# Patient Record
Sex: FEMALE | Race: WHITE
[De-identification: names, ages, dates, MRNs, and addresses within clinical notes are randomized per-mention and may not be internally consistent; named-entity substitution may affect disease eponyms.]

---

## 2018-07-07 ENCOUNTER — HOSPITAL ENCOUNTER (INPATIENT)
Dept: HOSPITAL 5 - TRG | Age: 43
LOS: 1 days | Discharge: HOME | End: 2018-07-08
Attending: OBSTETRICS & GYNECOLOGY | Admitting: OBSTETRICS & GYNECOLOGY
Payer: COMMERCIAL

## 2018-07-07 DIAGNOSIS — Z3A.38: ICD-10-CM

## 2018-07-07 DIAGNOSIS — D64.9: ICD-10-CM

## 2018-07-07 DIAGNOSIS — Z88.0: ICD-10-CM

## 2018-07-07 DIAGNOSIS — F32.9: ICD-10-CM

## 2018-07-07 LAB
HCT VFR BLD CALC: 35.2 % (ref 30.3–42.9)
HGB BLD-MCNC: 11.7 GM/DL (ref 10.1–14.3)
MCH RBC QN AUTO: 27 PG (ref 28–32)
MCHC RBC AUTO-ENTMCNC: 33 % (ref 30–34)
MCV RBC AUTO: 81 FL (ref 79–97)
PLATELET # BLD: 255 K/MM3 (ref 140–440)
RBC # BLD AUTO: 4.36 M/MM3 (ref 3.65–5.03)

## 2018-07-07 PROCEDURE — 90715 TDAP VACCINE 7 YRS/> IM: CPT

## 2018-07-07 PROCEDURE — 86592 SYPHILIS TEST NON-TREP QUAL: CPT

## 2018-07-07 PROCEDURE — 85018 HEMOGLOBIN: CPT

## 2018-07-07 PROCEDURE — 86900 BLOOD TYPING SEROLOGIC ABO: CPT

## 2018-07-07 PROCEDURE — 85027 COMPLETE CBC AUTOMATED: CPT

## 2018-07-07 PROCEDURE — 86850 RBC ANTIBODY SCREEN: CPT

## 2018-07-07 PROCEDURE — 36415 COLL VENOUS BLD VENIPUNCTURE: CPT

## 2018-07-07 PROCEDURE — 59025 FETAL NON-STRESS TEST: CPT

## 2018-07-07 PROCEDURE — 90471 IMMUNIZATION ADMIN: CPT

## 2018-07-07 PROCEDURE — 86901 BLOOD TYPING SEROLOGIC RH(D): CPT

## 2018-07-07 PROCEDURE — 85014 HEMATOCRIT: CPT

## 2018-07-07 RX ADMIN — HYDROCODONE BITARTRATE AND ACETAMINOPHEN PRN EACH: 5; 325 TABLET ORAL at 19:14

## 2018-07-07 RX ADMIN — IBUPROFEN SCH MG: 600 TABLET, FILM COATED ORAL at 23:26

## 2018-07-07 RX ADMIN — DOCUSATE SODIUM SCH MG: 100 CAPSULE, LIQUID FILLED ORAL at 23:26

## 2018-07-07 RX ADMIN — FERROUS SULFATE TAB 325 MG (65 MG ELEMENTAL FE) SCH MG: 325 (65 FE) TAB at 23:26

## 2018-07-07 RX ADMIN — IBUPROFEN SCH MG: 600 TABLET, FILM COATED ORAL at 19:13

## 2018-07-07 NOTE — PROCEDURE NOTE
OB Delivery Note





- Delivery


Date of Delivery: 18


Surgeon: NARENDRA HOYT


Estimated blood loss: other (400 cc)





- Vaginal


Delivery presentation: vertex


Delivery position: OA


Intrapartum events: meconium


Delivery induction: none


Delivery monitor: external FHT, external uterine


Route of delivery: 


Delivery placenta: spontaneous


Delivery cord: 3 umbilical vessels


Episiotomy: none


Delivery laceration: none


Anesthesia: none


Delivery comments: 


Spontaneous vaginal delivery of liveborn female infant weighing 7 lb. 14 oz. 

over intact perineum with apgars of 8/9. Meconium stained amniotic fluid; NICU 

team present for delivery and suctioned baby. 3 vessel cord double clamped and 

cut. Spontaneous cry and respirations. Cord blood obtained. Spontaneous 

delivery of intact placenta and membranes by lopez mechanism.  cc. 

Pitocin to IV fluids after delivery of placenta. Fundus firm and midline. 

Vaginal sweep negative. Sponge count correct. No lacerations noted. Mother and 

baby stable in birthing room.

## 2018-07-07 NOTE — HISTORY AND PHYSICAL REPORT
History of Present Illness


Date of examination: 18


Date of admission: 


2018





Chief complaint: 


Contractions





History of present illness: 


42 year old  presents to L&D with complaint of contractions and leaking 

clear fluid from vagina since this morning.


Patient denies vaginal bleeding. She reports active fetal movement. Patient 

received prenatal care at [facility] and prenatal records are 

available. 


EDC 18. 


Pregnancy significant for AMA, circumvallate placenta, depression (treated with 

Zoloft), hyperemesis gravidarum, gallstones, ASCUS pap, and abnormal quad 

screen (increased risk for Down Syndrome).


Prenatal labs are as follows: O+, antibody screen negative, rubella immune, 

ASCUS pap, HIV negative, RPR negative, hepatitis B surface antigen negative, 

quad screen positive (increased risk for Down Syndrome), GBS negative, diabetes 

screen 84. 





Past History


Past Medical History: other (depression, gallbladder disease, abnormal pap smear

)


Past Surgical History: no surgical history


GYN History: abnormal PAP smear.  denies: chlamydia, gonorrhea, hepatitis B, 

hepatitis C, herpes, HIV, syphilis


Family/Genetic History: none


Social history: lives with family, full code (first child murdered at age 10 

years).  denies: smoking, alcohol abuse, prescription drug abuse, IV drug use





- Obstetrical History


Expected Date of Delivery: 18


Actual Gestation: 38 Week(s) 3 Day(s) 


: 3


Para: 2


Hx # Term Pregnancies: 3


Number of  Pregnancies: 0


Spontaneous Abortions: 0


Induced : 0


Number of Living Children: 1





Medications and Allergies


 Allergies











Allergy/AdvReac Type Severity Reaction Status Date / Time


 


Penicillins AdvReac  Rash Verified 18 08:02











 Home Medications











 Medication  Instructions  Recorded  Confirmed  Last Taken  Type


 


No Known Home Medications [No  18 Unknown History





Reported Home Medications]     











Active Meds: 


Active Medications





Ephedrine Sulfate (Ephedrine Sulfate)  10 mg IV Q2M PRN


   PRN Reason: Hypotension


Fentanyl (Sublimaze)  100 mcg IV Q2H PRN


   PRN Reason: Labor Pain


Lactated Ringer's (Lactated Ringers)  1,000 mls @ 125 mls/hr IV AS DIRECT ISABELA


Oxytocin/Sodium Chloride (Pitocin/Ns 20 Unit/1000ml Drip)  20 units in 1,000 

mls @ 125 mls/hr IV AS DIRECT ISABELA


Terbutaline Sulfate (Brethine)  0.25 mg SUB-Q ONCE PRN


   PRN Reason: Hyperstimulation/Hypertonicity











Review of Systems


All systems: negative (leaking of fluid and contractions)





- Vital Signs


Vital signs: 


 Vital Signs











Pulse BP


 


 83   113/66 


 


 18 08:20  18 08:20








 











Temp Pulse Resp BP Pulse Ox


 


 98.4 F   90   20   104/62    


 


 18 08:37  18 12:17  18 08:37  18 12:17   














- Physical Exam


Cardiovascular: Regular rate, Normal S1, Normal S2


Lungs: Positive: Clear to auscultation


Abdomen: Positive: normal appearance, soft.  Negative: distention, tenderness, 

guarding, rigidity


Genitourinary (Female): Positive: normal external genitalia.  Negative: perineal

/vulvar lesions (no lesions seen )


Uterus: Positive: enlarged.  Negative: tender (size=dates)


Anus/Rectum: Positive: normal perianal skin


Extremities: Positive: normal.  Negative: tenderness, edema





- Obstetrical


FHR: category 1


Uterine Contraction Monitor Mode: External


Cervical Dilatation: 6


Cervical Effacement Percentage: 90


Fetal station: -1


Uterine Contraction Frequency (min): every 2-3 minutes


Uterine Contraction Pattern: Regular


Uterine Contraction Intensity: Moderate





Results


Result Diagrams: 


 18 08:44





 Abnormal lab results











  18 Range/Units





  08:44 


 


MCH  27 L  (28-32)  pg


 


RDW  16.1 H  (13.2-15.2)  %








All other labs normal.








Assessment and Plan


A: Pregnancy at 38 weeks, 3 days gestation. 


Active labor. 


GBS negative.


SROM.


P: Admit.


Anticipate .

## 2018-07-08 VITALS — DIASTOLIC BLOOD PRESSURE: 51 MMHG | SYSTOLIC BLOOD PRESSURE: 102 MMHG

## 2018-07-08 LAB
HCT VFR BLD CALC: 31.5 % (ref 30.3–42.9)
HGB BLD-MCNC: 10.7 GM/DL (ref 10.1–14.3)

## 2018-07-08 RX ADMIN — IBUPROFEN SCH MG: 600 TABLET, FILM COATED ORAL at 06:05

## 2018-07-08 RX ADMIN — IBUPROFEN SCH: 600 TABLET, FILM COATED ORAL at 12:15

## 2018-07-08 RX ADMIN — FERROUS SULFATE TAB 325 MG (65 MG ELEMENTAL FE) SCH MG: 325 (65 FE) TAB at 10:09

## 2018-07-08 RX ADMIN — HYDROCODONE BITARTRATE AND ACETAMINOPHEN PRN EACH: 5; 325 TABLET ORAL at 10:08

## 2018-07-08 RX ADMIN — IBUPROFEN SCH MG: 600 TABLET, FILM COATED ORAL at 18:52

## 2018-07-08 RX ADMIN — DOCUSATE SODIUM SCH MG: 100 CAPSULE, LIQUID FILLED ORAL at 18:52

## 2018-07-08 NOTE — DISCHARGE SUMMARY
Providers





- Providers


Date of Admission: 


18 13:19





Date of discharge: 18


Attending physician: 


GIORGI LOYOLA





Labs





Primary care physician: 


BRANDEN CERON MD








Hospitalization


Reason for admission: active labor


Delivery: 


Procedure details: 


Spontaneous Vaginal Delivery





Episiotomy: none


Other postpartum procedures: none


Postpartum complications: none


Discharge diagnosis: IUP at term delivered


Hoffman baby: female


Pertinent studies: 


Labs





Hospital course: 


Normal hospital course





Condition at discharge: Good


Disposition: DC-01 TO HOME OR SELFCARE





- Discharge Diagnoses


(1) Term pregnancy delivered


Status: Acute   





Plan





- Provider Discharge Summary


Activity: routine, no sex for 6 weeks, no heavy lifting 4 weeks, no strenuous 

exercise


Diet: routine


Instructions: routine


Additional instructions: 








Call your doctor immediately for:


* Fever > 100.5


* Heavy vaginal bleeding ( >1 pad per hour)


* Severe persistent headache


* Shortness of breath


* Reddened, hot, painful area to leg or breast








- Follow up plan


Follow up: 


BRANDEN CERON MD [Primary Care Provider] - 6 Weeks


Forms:  [facility] Discharge Summary

## 2018-07-08 NOTE — PROGRESS NOTE
Assessment and Plan


A: Postpartum day 1. 


Anemia.


P: Discharge patient home today when baby is able to go. Advised pt. to 

continue to take her prenatal vitamins and iron. Advised pt. to use Motrin for 

cramping. Discussed with pt. in detail postpartum discharge instructions and 

postpartum warning signs. Advised pt. to avoid IC, avoid heavy lifting and 

housework. Advised pt. to follow up in OB-GYN clinic in 6 weeks. Pt. voiced 

understanding of all instructions. 








Subjective





- Subjective


Date of service: 18


Principal diagnosis: Postpartum day 1 S/P 


Interval history: 


Postpartum day 1 S/P . Doing well. Patient desires discharge today.


Voiding without difficulty. Ambulating well. Tolerating a regular diet without 

nausea or vomiting. Patient reports small amount of lochia. 


Patient denies headache, visual disturbance, chest pain, cough, shortness of 

breath, abdominal pain, leg pain, heavy bleeding, or symptoms of depression. 


Patient does not want to use birth control at this time. 


Patient reports: appetite normal, voiding normally, pain well controlled, flatus

, ambulating normally


: doing well





Objective





- Vital Signs


Latest vital signs: 


 Vital Signs











  Temp Pulse Resp BP BP Pulse Ox


 


 18 11:32  98.1 F  75  16  95/51   97


 


 18 07:31  98.0 F  74  20  92/61   97


 


 18 05:00  80 F L  80  18   98/52 


 


 18 01:05  98.3 F  71  18   97/53 


 


 18 21:15  98.3 F  80  18   106/50 


 


 18 18:20  98.3 F  86  18   100/54  99


 


 18 18:16   85   104/56  








 Intake and Output











 18





 07:59 15:59 23:59


 


Intake Total 240 440 


 


Output Total 800  


 


Balance -560 440 


 


Intake:   


 


  Oral  440 


 


  Intake, Free Water 240  


 


Output:   


 


  Urine 800  


 


    Void 800  


 


Other:   


 


  Total, Intake Amount  240 


 


  Total, Output Amount 800  


 


  # Voids   


 


    Void  1 














- Exam


Cardiovascular: Present: Regular rate, Normal S1, Normal S2


Lungs: Present: Clear to auscultation


Abdomen: Present: normal appearance, soft, normal bowel sounds.  Absent: 

distention, tenderness, guarding


Uterus: Present: normal, firm, fundal height below umbilicus.  Absent: bogginess

, tenderness


Extremities: Present: normal.  Absent: tenderness, edema

## 2020-08-27 ENCOUNTER — HOSPITAL ENCOUNTER (EMERGENCY)
Dept: HOSPITAL 5 - ED | Age: 45
LOS: 1 days | Discharge: HOME | End: 2020-08-28
Payer: SELF-PAY

## 2020-08-27 DIAGNOSIS — Z79.899: ICD-10-CM

## 2020-08-27 DIAGNOSIS — Z88.0: ICD-10-CM

## 2020-08-27 DIAGNOSIS — N39.0: ICD-10-CM

## 2020-08-27 DIAGNOSIS — R11.2: ICD-10-CM

## 2020-08-27 DIAGNOSIS — K80.50: Primary | ICD-10-CM

## 2020-08-27 LAB
ALBUMIN SERPL-MCNC: 4.6 G/DL (ref 3.9–5)
ALT SERPL-CCNC: 17 UNITS/L (ref 7–56)
BASOPHILS # (AUTO): 0 K/MM3 (ref 0–0.1)
BASOPHILS NFR BLD AUTO: 0.5 % (ref 0–1.8)
BILIRUB UR QL STRIP: (no result)
BLOOD UR QL VISUAL: (no result)
BUN SERPL-MCNC: 6 MG/DL (ref 7–17)
BUN/CREAT SERPL: 10 %
CALCIUM SERPL-MCNC: 9.2 MG/DL (ref 8.4–10.2)
EOSINOPHIL # BLD AUTO: 0 K/MM3 (ref 0–0.4)
EOSINOPHIL NFR BLD AUTO: 0.4 % (ref 0–4.3)
HCT VFR BLD CALC: 39.4 % (ref 30.3–42.9)
HEMOLYSIS INDEX: 4
HGB BLD-MCNC: 13.2 GM/DL (ref 10.1–14.3)
LYMPHOCYTES # BLD AUTO: 2.2 K/MM3 (ref 1.2–5.4)
LYMPHOCYTES NFR BLD AUTO: 22.8 % (ref 13.4–35)
MCHC RBC AUTO-ENTMCNC: 34 % (ref 30–34)
MCV RBC AUTO: 90 FL (ref 79–97)
MONOCYTES # (AUTO): 0.5 K/MM3 (ref 0–0.8)
MONOCYTES % (AUTO): 5.3 % (ref 0–7.3)
MUCOUS THREADS #/AREA URNS HPF: (no result) /HPF
PH UR STRIP: 9 [PH] (ref 5–7)
PLATELET # BLD: 256 K/MM3 (ref 140–440)
PROT UR STRIP-MCNC: (no result) MG/DL
RBC # BLD AUTO: 4.39 M/MM3 (ref 3.65–5.03)
RBC #/AREA URNS HPF: > 182 /HPF (ref 0–6)
UROBILINOGEN UR-MCNC: < 2 MG/DL (ref ?–2)
WBC #/AREA URNS HPF: 7 /HPF (ref 0–6)

## 2020-08-27 PROCEDURE — 96374 THER/PROPH/DIAG INJ IV PUSH: CPT

## 2020-08-27 PROCEDURE — 74177 CT ABD & PELVIS W/CONTRAST: CPT

## 2020-08-27 PROCEDURE — 76705 ECHO EXAM OF ABDOMEN: CPT

## 2020-08-27 PROCEDURE — 96375 TX/PRO/DX INJ NEW DRUG ADDON: CPT

## 2020-08-27 PROCEDURE — 85025 COMPLETE CBC W/AUTO DIFF WBC: CPT

## 2020-08-27 PROCEDURE — 80053 COMPREHEN METABOLIC PANEL: CPT

## 2020-08-27 PROCEDURE — 81001 URINALYSIS AUTO W/SCOPE: CPT

## 2020-08-27 PROCEDURE — 99284 EMERGENCY DEPT VISIT MOD MDM: CPT

## 2020-08-27 PROCEDURE — 83690 ASSAY OF LIPASE: CPT

## 2020-08-27 PROCEDURE — 36415 COLL VENOUS BLD VENIPUNCTURE: CPT

## 2020-08-27 PROCEDURE — 96361 HYDRATE IV INFUSION ADD-ON: CPT

## 2020-08-27 PROCEDURE — 84703 CHORIONIC GONADOTROPIN ASSAY: CPT

## 2020-08-27 NOTE — EMERGENCY DEPARTMENT REPORT
ED Abdominal Pain HPI





- General


Chief Complaint: Abdominal Pain


Stated Complaint: ABD PAIN


PUI?: No


Time Seen by Provider: 08/27/20 14:55


Source: patient


Mode of arrival: Ambulatory


Limitations: Language Barrier





- History of Present Illness


Initial Comments: 





Patient is a 44-year-old female that presents emergency room with complaints of 

right upper and right lower quadrant pain.  Patient states it started 2 days 

ago.  Patient states the pain is worsening.  Patient that she is also having 

nausea and vomiting.  Patient states she has a history of gallstones.  Patient 

states that she was told 2 years ago she had gallstones while she was pregnant 

but never had her gallbladder removed.  Patient denies fever and chills.  Patie

nt denies being pregnant.  Patient denies dysuria.  Patient denies back pain.  

Patient denies the pain is radiating.  Patient states the pain is a 10 out of 

10.  Patient states the pain is worsening.  Patient states the pain is better 

with rest and worse with palpation and movement.








Patient denies recent travel.  Patient denies recent international travel.  

Patient denies exposure to the novel coronavirus.  Patient denies sick contacts.

 Patient denies fever and chills.  Patient denies cough.  Patient denies 

diarrhea.  Patient denies coming in contact with anybody with symptoms of the 

novel coronavirus.








MD Complaint: abdominal pain


-: Sudden


Location: RUQ, RLQ


Radiation: none


Migration to: no migration


Severity: severe


Severity scale (0 -10): 10


Quality: stabbing


Consistency: constant


Improves With: rest


Worsens With: movement


Associated Symptoms: denies other symptoms, nausea, vomiting.  denies: diarrhea,

fever, chills, constipation, dysuria, hematemesis, hematochezia, melena, hem

aturia, anorexia, syncope





- Related Data


LMP (females 10-50): last week


                                  Previous Rx's











 Medication  Instructions  Recorded  Last Taken  Type


 


Ciprofloxacin HCl [Ciprofloxacin 500 mg PO Q12HR 10 Days #20 tab 08/28/20 

Unknown Rx





TAB]    


 


Ondansetron [Zofran Odt] 4 mg PO Q6HR PRN #20 tab.rapdis 08/28/20 Unknown Rx











                                    Allergies











Allergy/AdvReac Type Severity Reaction Status Date / Time


 


Penicillins Allergy  Rash Verified 08/27/20 14:55














ED Review of Systems


ROS: 


Stated complaint: ABD PAIN


Other details as noted in HPI





Constitutional: denies: chills, fever


Eyes: denies: eye pain, eye discharge, vision change


ENT: denies: ear pain, throat pain


Respiratory: denies: cough, shortness of breath, wheezing


Cardiovascular: denies: chest pain, palpitations


Endocrine: no symptoms reported


Gastrointestinal: abdominal pain, nausea, vomiting.  denies: diarrhea


Genitourinary: denies: urgency, dysuria, discharge


Musculoskeletal: denies: back pain, joint swelling, arthralgia


Skin: denies: rash, lesions


Neurological: denies: headache, weakness, paresthesias


Psychiatric: denies: anxiety, depression


Hematological/Lymphatic: denies: easy bleeding, easy bruising





ED Past Medical Hx





- Past Medical History


Previous Medical History?: Yes


Hx Hypertension: No


Hx Congestive Heart Failure: No


Hx Diabetes: No


Hx Deep Vein Thrombosis: No


Hx Renal Disease: No


Hx Sickle Cell Disease: No


Hx Seizures: No


Hx Asthma: No


Hx COPD: No


Hx HIV: No


Additional medical history: Vaginal delivery x 2 , Gallstones, Gallbladder 

sludge, Miscarriage





- Surgical History


Past Surgical History?: No





- Family History


Family history: no significant





- Social History


Smoking Status: Never Smoker


Substance Use Type: None





- Medications


Home Medications: 


                                Home Medications











 Medication  Instructions  Recorded  Confirmed  Last Taken  Type


 


Ciprofloxacin HCl [Ciprofloxacin 500 mg PO Q12HR 10 Days #20 tab 08/28/20  

Unknown Rx





TAB]     


 


Ondansetron [Zofran Odt] 4 mg PO Q6HR PRN #20 tab.rapdis 08/28/20  Unknown Rx














ED Physical Exam





- General


Limitations: Language Barrier


General appearance: alert, in no apparent distress





- Head


Head exam: Present: atraumatic, normocephalic





- Eye


Eye exam: Present: normal appearance





- ENT


ENT exam: Present: mucous membranes moist





- Neck


Neck exam: Present: normal inspection





- Respiratory


Respiratory exam: Present: normal lung sounds bilaterally.  Absent: respiratory 

distress





- Cardiovascular


Cardiovascular Exam: Present: regular rate, normal rhythm.  Absent: systolic 

murmur, diastolic murmur, rubs, gallop





- GI/Abdominal


GI/Abdominal exam: Present: soft, tenderness (Right upper and right lower 

quadrant tenderness.), normal bowel sounds





- Extremities Exam


Extremities exam: Present: normal inspection





- Back Exam


Back exam: Present: normal inspection





- Neurological Exam


Neurological exam: Present: alert, oriented X3





- Psychiatric


Psychiatric exam: Present: normal affect, normal mood





- Skin


Skin exam: Present: warm, dry, intact, normal color.  Absent: rash





ED Course


                                   Vital Signs











  08/27/20 08/28/20





  14:53 02:00


 


Temperature 98.5 F 


 


Pulse Rate 92 H 92 H


 


Respiratory 14 12





Rate  


 


Blood Pressure 132/78 129/66





[Left]  


 


O2 Sat by Pulse 100 98





Oximetry  














- Reevaluation(s)


Reevaluation #1: 


Patient states her pain is better.  Patient is resting more comfortably.


08/28/20 01:01





Reevaluation #2: 


I discussed all results and clinical findings with patient.  I discussed plan of

 care with patient.  Patient agrees with plan of care.  Patient is stable for 

discharge.  Patient will be discharged home.  Patient given discharge 

instructions.  Patient voiced understanding of discharge instructions.


08/28/20 01:48








ED Medical Decision Making





- Lab Data


Result diagrams: 


                                 08/27/20 15:00





                                 08/27/20 15:00





- Radiology Data


Radiology results: report reviewed





CT ABDOMEN AND PELVIS WITH IV CONTRAST 





INDICATION: Right upper and right lower quadrant abdominal pain 





TECHNIQUE: Following the administration of intravenous contrast, multiple axial 

CT images of the 


 abdomen and pelvis were acquired. Sagittal and coronal reformats were obtained.

 All CT performed at


 this facility utilize dose reduction techniques including automated exposure 

control, iterative 


 reconstruction and weight based dosing when appropriate to reduce patient 

radiation dose to as low 


 as reasonably achievable. 





COMPARISON: None 





FINDINGS: 


Limited imaging of the bilateral lung bases demonstrates no evidence of acute 

abnormality. 





Abdomen: The liver, gallbladder, spleen, pancreas, bilateral adrenal glands and 

bilateral kidneys 


 show no evidence of acute abnormality. A few small right renal cysts are noted.

 There is no evidence


 of bowel obstruction. The appendix is visualized and appears normal. 





Pelvis: No free fluid is seen within the pelvis. An IUD is present. The urinary 

bladder is 


 decompressed and not well-visualized. 





Bones and Soft Tissues: Evaluation of bony structures demonstrates no evidence 

of acute bony 


 abnormality. Evaluation of soft tissue structures demonstrates no evidence of 

acute soft tissue 


 abnormality. 





IMPRESSION: 


1. No evidence of acute inflammatory or obstructive process within the abdomen 

or pelvis. 














 ULTRASOUND ABDOMEN, LIMITED (RIGHT UPPER QUADRANT) 





INDICATION: Right upper quadrant pain. 





COMPARISON: CT of the abdomen and pelvis, 8/27/2020 





FINDINGS: 


Pancreas: Visualized portion shows no significant abnormality. 


Liver: The liver appears normal in size and echogenicity. 


Gallbladder: The gallbladder shows no evidence of wall thickening, stones or 

sludge. 


Bile ducts: Common Bile Duct is normal in caliber measuring less than 2 mm 


Free fluid: None. 





Additional Findings: None. 





IMPRESSION: 


1. No sonographic abnormality of the right upper quadrant. 

















- Medical Decision Making





Patient is a 44-year-old female that presents emergency room with right upper 

and right lower quadrant pain.  Patient also complained of nausea vomiting.  

Patient has history of gallstones.  Patient had a CAT scan done which was 

negative for acute finding.  Patient had a ultrasound done of the right upper 

quadrant was negative for acute findings.  Patient's labs were essentially 

unremarkable except for a UTI.  Patient stable for discharge.  Patient 

discharged home.  Patient given fluids and pain meds and nausea medications in 

the ER.  Patient tolerated p.o. challenge prior to discharge.  Patient responded

 well to treatment.  Patient left ER essentially asymptomatic.





Patient clinical findings are consistent with biliary colic and UTI.





- Differential Diagnosis


Biliary colic, cholecystitis, ABD pain, nausea, vomiting, UTI


Critical care attestation.: 


If time is entered above; I have spent that time in minutes in the direct care 

of this critically ill patient, excluding procedure time.








ED Disposition


Clinical Impression: 


 Biliary colic





Nausea & vomiting


Qualifiers:


 Vomiting type: unspecified Vomiting Intractability: non-intractable Qualified 

Code(s): R11.2 - Nausea with vomiting, unspecified





UTI (urinary tract infection)


Qualifiers:


 Urinary tract infection type: acute cystitis Hematuria presence: with hematuria

 Qualified Code(s): N30.01 - Acute cystitis with hematuria





Abdominal pain


Qualifiers:


 Abdominal location: unspecified location Qualified Code(s): R10.9 - Unspecified

 abdominal pain





Disposition: DC-01 TO HOME OR SELFCARE


Is pt being admited?: No


Does the pt Need Aspirin: No


Condition: Stable


Instructions:  Biliary Colic (ED), Urinary Tract Infection in Women (ED), 

Abdominal Pain (ED)


Additional Instructions: 


Patient to follow-up with primary care in 2 to 3 days.  Patient to follow-up 

with general surgery in 2 to 3 days.  Patient to rest.  Patient to increase 

water.  Patient to avoid strenuous exercise or heavy lifting until cleared by 

general surgery.  Patient eat a low-fat diet.  Patient to eat a brat diet.  

Patient to take Tylenol or ibuprofen as needed for pain.  Patient to take meds 

as directed.  Patient to return to the ER if condition worsens, changes or new 

symptoms arise.


Prescriptions: 


Ciprofloxacin HCl [Ciprofloxacin TAB] 500 mg PO Q12HR 10 Days #20 tab


Ondansetron [Zofran Odt] 4 mg PO Q6HR PRN #20 tab.rapdis


 PRN Reason: Nausea And Vomiting


Referrals: 


PRIMARY CARE,MD [Primary Care Provider] - 2-3 Days


BORIS RIZZO MD [Staff Physician] - 2-3 Days


Time of Disposition: 01:52


Print Language: Mauritanian

## 2020-08-27 NOTE — EMERGENCY DEPARTMENT REPORT
Blank Doc





- Documentation


Documentation: 





44-year-old female that presents with RLQ pain.





This initial assessment/diagnostic orders/clinical plan/treatment(s) is/are 

subject to change based on patient's health status, clinical progression and re-

assessment by fellow clinical providers in the ED.  Further treatment and workup

at subsequent clinical providers discretion.  Patient/guardians urged not to 

elope from the ED as their condition may be serious if not clinically assessed 

and managed.  Initial orders include:


1- Patient sent to [facility] for further evaluation and treatment


2- labs


3- UA

## 2020-08-27 NOTE — CAT SCAN REPORT
CT ABDOMEN AND PELVIS WITH IV CONTRAST



INDICATION: Right upper and right lower quadrant abdominal pain 



TECHNIQUE: Following the administration of intravenous contrast, multiple axial CT images of the abdo
men and pelvis were acquired. Sagittal and coronal reformats were obtained.  All CT performed at this
 facility utilize dose reduction techniques including automated exposure control, iterative reconstru
ction and weight based dosing when appropriate to reduce patient radiation dose to as low as reasonab
ly achievable.  



COMPARISON: None



FINDINGS:

Limited imaging of the bilateral lung bases demonstrates no evidence of acute abnormality.



Abdomen: The liver, gallbladder, spleen, pancreas, bilateral adrenal glands and bilateral kidneys ariadna
w no evidence of acute abnormality. A few small right renal cysts are noted. There is no evidence of 
bowel obstruction. The appendix is visualized and appears normal.



Pelvis: No free fluid is seen within the pelvis. An IUD is present. The urinary bladder is decompress
ed and not well-visualized.



Bones and Soft Tissues: Evaluation of bony structures demonstrates no evidence of acute bony abnormal
ity. Evaluation of soft tissue structures demonstrates no evidence of acute soft tissue abnormality.



IMPRESSION:

1. No evidence of acute inflammatory or obstructive process within the abdomen or pelvis.



Signer Name: Sulma Boudreaux MD 

Signed: 8/27/2020 10:50 PM

Workstation Name: [id number]

## 2020-08-28 VITALS — DIASTOLIC BLOOD PRESSURE: 66 MMHG | SYSTOLIC BLOOD PRESSURE: 129 MMHG

## 2020-08-28 NOTE — ULTRASOUND REPORT
ULTRASOUND ABDOMEN, LIMITED (RIGHT UPPER QUADRANT)



INDICATION: Right upper quadrant pain.



COMPARISON: CT of the abdomen and pelvis, 8/27/2020



FINDINGS:

Pancreas: Visualized portion shows no significant abnormality.

Liver: The liver appears normal in size and echogenicity.

Gallbladder: The gallbladder shows no evidence of wall thickening, stones or sludge. 

Bile ducts: Common Bile Duct is normal in caliber measuring less than 2 mm

Free fluid: None.



Additional Findings: None.



IMPRESSION:

1. No sonographic abnormality of the right upper quadrant. 



Signer Name: Sulma Boudreaux MD 

Signed: 8/28/2020 1:22 AM

Workstation Name: [id number]